# Patient Record
Sex: MALE | Race: WHITE | NOT HISPANIC OR LATINO | Employment: FULL TIME | URBAN - METROPOLITAN AREA
[De-identification: names, ages, dates, MRNs, and addresses within clinical notes are randomized per-mention and may not be internally consistent; named-entity substitution may affect disease eponyms.]

---

## 2020-01-17 ENCOUNTER — HOSPITAL ENCOUNTER (EMERGENCY)
Facility: HOSPITAL | Age: 35
Discharge: HOME | End: 2020-01-17
Admitting: EMERGENCY MEDICINE

## 2020-01-17 ENCOUNTER — APPOINTMENT (OUTPATIENT)
Dept: CARDIOLOGY | Facility: HOSPITAL | Age: 35
End: 2020-01-17

## 2020-01-17 ENCOUNTER — APPOINTMENT (OUTPATIENT)
Dept: GENERAL RADIOLOGY | Facility: HOSPITAL | Age: 35
End: 2020-01-17

## 2020-01-17 VITALS
DIASTOLIC BLOOD PRESSURE: 77 MMHG | OXYGEN SATURATION: 98 % | WEIGHT: 178.79 LBS | BODY MASS INDEX: 27.1 KG/M2 | SYSTOLIC BLOOD PRESSURE: 115 MMHG | HEART RATE: 78 BPM | RESPIRATION RATE: 16 BRPM | TEMPERATURE: 98 F | HEIGHT: 68 IN

## 2020-01-17 DIAGNOSIS — I80.02 THROMBOPHLEBITIS OF SUPERFICIAL VEINS OF LEFT LOWER EXTREMITY: Primary | ICD-10-CM

## 2020-01-17 LAB
BH CV LOW VAS LEFT GREATER SAPH BK VESSEL: 1
BH CV LOWER VASCULAR LEFT COMMON FEMORAL AUGMENT: NORMAL
BH CV LOWER VASCULAR LEFT COMMON FEMORAL COMPETENT: NORMAL
BH CV LOWER VASCULAR LEFT COMMON FEMORAL COMPRESS: NORMAL
BH CV LOWER VASCULAR LEFT COMMON FEMORAL PHASIC: NORMAL
BH CV LOWER VASCULAR LEFT COMMON FEMORAL SPONT: NORMAL
BH CV LOWER VASCULAR LEFT DISTAL FEMORAL COMPRESS: NORMAL
BH CV LOWER VASCULAR LEFT GASTRONEMIUS COMPRESS: NORMAL
BH CV LOWER VASCULAR LEFT GREATER SAPH AK COMPRESS: NORMAL
BH CV LOWER VASCULAR LEFT GREATER SAPH BK COMPRESS: NORMAL
BH CV LOWER VASCULAR LEFT GREATER SAPH BK THROMBUS: NORMAL
BH CV LOWER VASCULAR LEFT LESSER SAPH COMPRESS: NORMAL
BH CV LOWER VASCULAR LEFT MID FEMORAL AUGMENT: NORMAL
BH CV LOWER VASCULAR LEFT MID FEMORAL COMPETENT: NORMAL
BH CV LOWER VASCULAR LEFT MID FEMORAL COMPRESS: NORMAL
BH CV LOWER VASCULAR LEFT MID FEMORAL PHASIC: NORMAL
BH CV LOWER VASCULAR LEFT MID FEMORAL SPONT: NORMAL
BH CV LOWER VASCULAR LEFT PERONEAL COMPRESS: NORMAL
BH CV LOWER VASCULAR LEFT POPLITEAL AUGMENT: NORMAL
BH CV LOWER VASCULAR LEFT POPLITEAL COMPETENT: NORMAL
BH CV LOWER VASCULAR LEFT POPLITEAL COMPRESS: NORMAL
BH CV LOWER VASCULAR LEFT POPLITEAL PHASIC: NORMAL
BH CV LOWER VASCULAR LEFT POPLITEAL SPONT: NORMAL
BH CV LOWER VASCULAR LEFT POSTERIOR TIBIAL COMPRESS: NORMAL
BH CV LOWER VASCULAR LEFT PROXIMAL FEMORAL COMPRESS: NORMAL
BH CV LOWER VASCULAR LEFT SAPHENOFEMORAL JUNCTION AUGMENT: NORMAL
BH CV LOWER VASCULAR LEFT SAPHENOFEMORAL JUNCTION COMPETENT: NORMAL
BH CV LOWER VASCULAR LEFT SAPHENOFEMORAL JUNCTION COMPRESS: NORMAL
BH CV LOWER VASCULAR LEFT SAPHENOFEMORAL JUNCTION PHASIC: NORMAL
BH CV LOWER VASCULAR LEFT SAPHENOFEMORAL JUNCTION SPONT: NORMAL
BH CV LOWER VASCULAR RIGHT COMMON FEMORAL AUGMENT: NORMAL
BH CV LOWER VASCULAR RIGHT COMMON FEMORAL COMPETENT: NORMAL
BH CV LOWER VASCULAR RIGHT COMMON FEMORAL COMPRESS: NORMAL
BH CV LOWER VASCULAR RIGHT COMMON FEMORAL PHASIC: NORMAL
BH CV LOWER VASCULAR RIGHT COMMON FEMORAL SPONT: NORMAL

## 2020-01-17 PROCEDURE — 73610 X-RAY EXAM OF ANKLE: CPT

## 2020-01-17 PROCEDURE — 73590 X-RAY EXAM OF LOWER LEG: CPT

## 2020-01-17 PROCEDURE — 93971 EXTREMITY STUDY: CPT

## 2020-01-17 PROCEDURE — 99283 EMERGENCY DEPT VISIT LOW MDM: CPT

## 2020-01-17 RX ORDER — ASPIRIN 81 MG/1
324 TABLET, CHEWABLE ORAL ONCE
Status: COMPLETED | OUTPATIENT
Start: 2020-01-17 | End: 2020-01-17

## 2020-01-17 RX ADMIN — ASPIRIN 81 MG 324 MG: 81 TABLET ORAL at 13:40

## 2020-01-17 NOTE — ED PROVIDER NOTES
Subjective   History of Present Illness  Patient is a 34-year-old male dimension of care for factor V Leiden mutation who presents with complaints of left lower extremity pain from that started in his ankle and radiates up to his knee for the past week.  Patient reports some associated swelling along the medial aspect at times.  Denies any recent injury to the area.  Patient does report some redness that he noticed along the medial malleolus yesterday.  States the pain is worse with movement better with rest currently rates it a 2/10 severity.  He denies any fever, long travel or immobilization recent surgeries paresthesias numbness or weakness of his lower extremities.  States is taken no medications for symptoms.  Review of Systems   Constitutional: Negative.    Respiratory: Negative.    Cardiovascular: Negative.    Gastrointestinal: Negative for abdominal pain, nausea and vomiting.   Musculoskeletal: Positive for arthralgias, joint swelling and myalgias. Negative for neck pain and neck stiffness.   Skin: Positive for color change.   Neurological: Negative.        Past Medical History:   Diagnosis Date   • Factor 5 Leiden mutation, heterozygous (CMS/HCC)    • Seizures (CMS/HCC)        No Known Allergies    History reviewed. No pertinent surgical history.    Family History   Problem Relation Age of Onset   • No Known Problems Mother        Social History     Socioeconomic History   • Marital status: Single     Spouse name: Not on file   • Number of children: Not on file   • Years of education: Not on file   • Highest education level: Not on file   Tobacco Use   • Smoking status: Current Every Day Smoker     Packs/day: 1.00     Types: Cigarettes   • Smokeless tobacco: Never Used   Substance and Sexual Activity   • Alcohol use: Yes     Frequency: Never     Comment: rare   • Drug use: No   • Sexual activity: Defer           Objective   Physical Exam   Constitutional: He is oriented to person, place, and time. He  "appears well-developed and well-nourished. No distress.   HENT:   Head: Normocephalic and atraumatic.   Eyes: Pupils are equal, round, and reactive to light. EOM are normal. No scleral icterus.   Cardiovascular: Normal rate, regular rhythm, normal heart sounds and intact distal pulses. Exam reveals no gallop and no friction rub.   No murmur heard.  Pulmonary/Chest: Effort normal and breath sounds normal. No stridor. He has no wheezes. He has no rales.   Musculoskeletal: He exhibits tenderness.        Left knee: Normal.        Left ankle: He exhibits no swelling, no deformity, no laceration and normal pulse. Tenderness. Medial malleolus tenderness found. Achilles tendon exhibits normal Nicole's test results.        Left lower leg: He exhibits tenderness. He exhibits no edema, no deformity and no laceration.   Tenderness noted to palpation along the medial malleolus of the left ankle and up the medial aspect of the distal left lower extremity.  No overlying erythema edema or warmth noted of the left knee.  Full range of motion of left knee and ankle with no pain noted.  Compartments soft.  Peripheral pulses intact.   Neurological: He is alert and oriented to person, place, and time. No cranial nerve deficit or sensory deficit.   Skin: Skin is warm. Capillary refill takes less than 2 seconds. No rash noted. He is not diaphoretic. There is erythema. No pallor.   Psychiatric: He has a normal mood and affect. His behavior is normal.   Nursing note and vitals reviewed.      Procedures           ED Course    /77 (BP Location: Right arm, Patient Position: Sitting)   Pulse 78   Temp 98 °F (36.7 °C) (Oral)   Resp 16   Ht 172.7 cm (68\")   Wt 81.1 kg (178 lb 12.7 oz)   SpO2 98%   BMI 27.19 kg/m²   Medications   aspirin chewable tablet 324 mg (has no administration in time range)     Labs Reviewed - No data to display   Xr Tibia Fibula 2 View Left    Result Date: 1/17/2020  No acute osseous abnormality.  " Electronically Signed By-Jamel Inman On:1/17/2020 12:33 PM This report was finalized on [id number] by  Elvi Faria    Xr Ankle 3+ View Left    Result Date: 1/17/2020  No acute osseous abnormality.  Electronically Signed By-Jamel Inman On:1/17/2020 12:32 PM This report was finalized on [id number] by  Elvi Faria                                               MDM  Number of Diagnoses or Management Options  Thrombophlebitis of superficial veins of left lower extremity:   Diagnosis management comments: Chart Review:  Comorbidity: Factor V Leiden mutation  Differentials: DVT, fracture, contusion, strain, sprain      ;this list is not all inclusive and does not constitute the entirety of considered causes  Labs: Not warranted  Imaging: Was interpreted by physician and reviewed by myself:  Xr Tibia Fibula 2 View Left  Result Date: 1/17/2020  No acute osseous abnormality.  Electronically Signed By-Jamel Inman On:1/17/2020 12:33 PM This report was finalized on [id number] by  Elvi Faria    Xr Ankle 3+ View Left  Result Date: 1/17/2020  No acute osseous abnormality.  Electronically Signed By-Jamel Inman On:1/17/2020 12:32 PM This report was finalized on [id number] by  Elvi Faria    Disposition/Treatment:  While in the ED patient was afebrile.  Nontoxic ambulatory with upright steady gait x-ray showed no acute osseous abnormalities Doppler ultrasound significant for superficial thrombophlebitis verbal feedback from Elisa technician.  Patient was given aspirin in the ED.  Lab results and findings were discussed with the patient  who voiced understanding of discharge instructions along with signs and symptoms requiring return to the ED.  Upon discharged patient was in stable condition with followup for a revaluation.  Patient was advised to use compression stockings and take aspirin daily patient states he will call his primary care provider for further evaluation.  He  continued to deny chest pain while in the ED.       Amount and/or Complexity of Data Reviewed  Tests in the radiology section of CPT®: reviewed        Final diagnoses:   Thrombophlebitis of superficial veins of left lower extremity            Anjelica Najera PA  01/17/20 1337       Anjelica Najera PA  01/17/20 1332

## 2020-01-17 NOTE — DISCHARGE INSTRUCTIONS
Wear compression stockings for the next 2 to 3 weeks especially while at work.  Take a daily over-the-counter aspirin    Follow-up with your primary care provider in 3-5 days.  If you do not have a primary care provider call [phone] for help in finding one, or you may follow up with [facility] at [phone].    Return to ED for any new or worsening symptoms

## 2020-01-17 NOTE — ED NOTES
Pt reports that his leg/ankle pain started at the ankle bone on the inside of his left leg about a week ago, has progressively gotten worse and worked up the leg to the knee     Khushboo Hoffman, RN  01/17/20 5488

## 2020-02-14 NOTE — PROGRESS NOTES
HEMATOLOGY ONCOLOGY OUTPATIENT CONSULTATION       Patient name: Keegan Mederos  : 1985  MRN: [id number]  Primary Care Physician: Cam Rivera MD  Referring Physician: Cam Rivera MD  Reason For Consult:     Chief Complaint   Patient presents with   • Appointment   History of deep venous thrombosis and a recent episode of Acute left lower extremity superficial thrombophlebitis in the greater saphenous vein.      HPI:   History of Present Illness:  Keegan Mederos is  a 34-year-old male who was admitted to the hospital on 6/9/15 after he presented with left lower extremity pain and swelling as well as pleuritic chest pains.  Patient said he had gone on a prolonged trip to [place], over 13 hours trip, a few weeks prior to the presentation.  He was admitted to the hospital on 6/9/15.  He had a Doppler study of the lower extremities which showed DVT involving the left peroneal vein.  He had a CT scan of the chest which showed multiple small segmental branch pulmonary emboli involving the lower lobes bilaterally, right more than left.  There were associated areas of pleural based airspace ground-glass opacity distal to the areas of PE suggesting pulmonary infarction or possibly airspace disease due to septic emboli.  Patient had several blood cultures done.  Septic emboli were ruled out.  Patient was initially treated with heparinoids and subsequently switched to Xarelto, which he is so far tolerating well.  He is here today to be further evaluated for possible causes for the blood clot.  His symptoms have almost completely resolved back to his baseline.  Patient states that prior to the above clinical presentation he was healthy.  He denied any weight loss, night sweats or fatigue symptoms or any pain issues.    • Patient had antithrombin III level (N), protein C activity (L) 50 with normal being , protein S activity (N) 153%.  Prolonged PT 17.4, PTT 58.8, INR of 1.4.  Mixing  studies suggested continued prolongation of the PTT to 37.7 seconds following one hour in the mixing test.  This suggests the presence of an inhibitor.  Prothrombin gene was not mutated.  Was found to have factor V heterozygote mutation.  Anticardiolipin antibodies were negative, as well as beta-2 glycoprotein.   • 7/31/2019 WBC 7.7, hemoglobin 15, MCV 88, platelets 300, creatinine 0.9 LFTs normal,  • July 2015 to February 2020: Patient has not followed up in our office.  • 1/17/2020: Patient presented to emergency department with swelling redness and pain in the left medial lower leg.  • 1/17/2020: Doppler of left lower leg: Acute left lower extremity superficial thrombophlebitis noted in the greater saphenous vein.      Subjective:02/19/20   Patient presents to the office for an initial consultation regarding blood clots. His mother is present at today's visit. He states that he was diagnosed with Factor Five Leiden and Pulmonary Embolism when he was 27. He was put on Heparin and a prescription blood thinner po daily, He was put on this medicine for around three or four months and was cleared.  He denies any family history of  clots.  He states around a month ago he had swelling, redness and pain of the left leg and went to the ER. He was told to wear compression stockings and take 325mg aspirin. He states the swelling and the pain have improved. He states he is still wearing the compression stockings. He states he has a seizure disorder, but does not take any medication for this due to side effects.     The following portions of the patient's history were reviewed and updated as appropriate: allergies, current medications, past family history, past medical history, past social history, past surgical history and problem list.     Past Medical History:   Diagnosis Date   • Factor 5 Leiden mutation, heterozygous (CMS/HCC)    • Seizures (CMS/HCC)    History of left leg deep venous thrombosis in June 2015 following a  "prolonged road trip.  History of pulmonary emboli involving the bilateral lower lobes in 2015.    No past surgical history on file.      Current Outpatient Medications:   •  aspirin 325 MG tablet, Take 325 mg by mouth Daily., Disp: , Rfl:     No Known Allergies    Family History   Problem Relation Age of Onset   • No Known Problems Mother        Cancer-related family history is not on file.    Social History     Tobacco Use   • Smoking status: Current Every Day Smoker     Packs/day: 1.00     Types: Cigarettes   • Smokeless tobacco: Never Used   Substance Use Topics   • Alcohol use: Yes     Frequency: Never     Comment: rare   • Drug use: No   Started smoking at age 17.      Social History     Social History Narrative    Patient is single. He has no children. He is a / for [employer]. He lives in [place]. He is a current smoker and has been since the age of [age].      ROS:     Review of Systems   Constitutional: Positive for fatigue (could be work-related). Negative for activity change, chills and fever.   HENT: Negative for drooling, ear discharge, mouth sores, nosebleeds and sore throat.    Eyes: Negative for photophobia, pain, redness and visual disturbance.   Respiratory: Negative for cough, choking, shortness of breath and wheezing.    Cardiovascular: Negative for chest pain and palpitations.   Gastrointestinal: Negative for abdominal pain, diarrhea, nausea and vomiting.   Genitourinary: Negative for dysuria.   Musculoskeletal: Negative for neck stiffness.   Skin: Negative for rash.   Neurological: Positive for seizures (simple atonic epiliptic seizures (right side goes numb)). Negative for syncope and speech difficulty.   Psychiatric/Behavioral: Negative for agitation, confusion and hallucinations.       Objective:    Vitals:    02/19/20 1510   BP: 131/85   Pulse: 111   Resp: 18   Temp: 97.9 °F (36.6 °C)   Weight: 81.1 kg (178 lb 12.8 oz)   Height: 172.7 cm (68\") "   PainSc: 0-No pain     ECOG  (0) Fully active, able to carry on all predisease performance without restriction    Physical Exam:     Physical Exam   Constitutional: He is oriented to person, place, and time. He appears well-developed and well-nourished.   HENT:   Head: Normocephalic and atraumatic.   Nose: Nose normal.   Mouth/Throat: Oropharynx is clear and moist. No oropharyngeal exudate.   Eyes: Conjunctivae and EOM are normal. No scleral icterus.   Neck: Normal range of motion. No tracheal deviation present. No thyromegaly present.   Cardiovascular: Regular rhythm and normal heart sounds.   Pulmonary/Chest: No stridor.   Abdominal: Soft. Bowel sounds are normal. He exhibits no distension and no mass. There is no tenderness.   Musculoskeletal: Normal range of motion. He exhibits no edema or deformity.   Lymphadenopathy:     He has no cervical adenopathy.   Neurological: He is alert and oriented to person, place, and time. No sensory deficit.   Skin: Skin is warm.   Psychiatric: His behavior is normal.   Vitals reviewed.        Lab Results - Last 18 Months   Lab Units 05/09/19  1624   WBC 10*3/uL 8.1   HEMOGLOBIN g/dL 15.0   HEMATOCRIT % 44.2   PLATELETS 10*3/uL 329   MCV fL 92.5     Lab Results - Last 18 Months   Lab Units 05/09/19  1624   SODIUM mmol/L 134*   POTASSIUM mmol/L 4.3   CHLORIDE mmol/L 101   TOTAL CO2 mmol/L 24   BUN mg/dL 12   CREATININE mg/dl 0.9   CALCIUM mg/dL 9.3   BILIRUBIN mg/dL 0.5   ALK PHOS IU/L 60   ALT (SGPT) IU/L 30   AST (SGOT) IU/L 22   GLUCOSE mg/dL 91       Lab Results   Component Value Date    GLUCOSE 91 05/09/2019    BUN 12 05/09/2019    CREATININE 0.9 05/09/2019    BCR 13.3 05/09/2019    K 4.3 05/09/2019    CO2 24 05/09/2019    CALCIUM 9.3 05/09/2019    ALBUMIN 4.3 05/09/2019    LABIL2 1.7 05/09/2019    AST 22 05/09/2019    ALT 30 05/09/2019       Lab Results - Last 18 Months   Lab Units 05/09/19  1624   INR  1.0   APTT sec 26.4       No results found for: IRON, TIBC,  FERRITIN    No results found for: FOLATE    No results found for: OCCULTBLD    No results found for: RETICCTPCT    No results found for: ESOHXSGK29  No results found for: SPEP, UPEP  No results found for: LDH, URICACID  No results found for: MAT, RF, SEDRATE  No results found for: FIBRINOGEN, HAPTOGLOBIN  Lab Results   Component Value Date    PTT 26.4 05/09/2019    INR 1.0 05/09/2019     No results found for:   No results found for: CEA  No components found for: CA-19-9  No results found for: PSA      Assessment/Plan     Assessment:  1. Acute left lower extremity greater saphenous thrombophlebitis: Resolving with aspirin and compression stocking.  2. History of deep venous thrombosis involving the left lower extremity.  This was in 2015 following a road trip.  He was treated with Xarelto for about 4 months.  3. Bilateral pulmonary emboli.   Same as above.  4. His thrombosis was likely provoked secondary to recent travel.   5. Heterozygote mutation for factor V Leiden.    6. Low protein C activity likely secondary to acute clot, but cannot rule out inherited deficiency.   .      PLAN:      1. Continue aspirin 325  2. Continue compression stocking for thrombophlebitis   3. check antiphospholipid antibodies, serum homocystine, lupus anticoagulant testing, protein C activity.  4. Check d-dimer to see if there is any active clotting process.   5.  Advised that is close family members need to be tested for factor V Leiden mutation.  6. we will follow patient back in 3 weeks.         Factor 5 Leiden mutation, heterozygous (CMS/HCC)  - Protein C Activity  - Lupus Anticoagulant  - Homocysteine  - D-dimer, Quantitative  - Anticardiolipin Antibody, IgG / M, Qn  - Beta-2 Glycoprotein Antibodies  - Phosphatidylserine Antibodies    Orders Placed This Encounter   Procedures   • Protein C Activity   • Lupus Anticoagulant   • Homocysteine   • D-dimer, Quantitative   • Anticardiolipin Antibody, IgG / M, Qn   • Beta-2  Glycoprotein Antibodies   • Phosphatidylserine Antibodies                 Thank you very much for providing the opportunity to participate in this patient’s care. Please do not hesitate to call if there are any other questions      I have reviewed all relevant outside reports, notes, labs results, imaging, vitals, medications and plan with the patient today.    Portions of the note have been Scribed by medical assistant/Nurse.  I have reviewed and made changes accordingly.

## 2020-02-19 ENCOUNTER — CONSULT (OUTPATIENT)
Dept: ONCOLOGY | Facility: CLINIC | Age: 35
End: 2020-02-19

## 2020-02-19 ENCOUNTER — APPOINTMENT (OUTPATIENT)
Dept: LAB | Facility: HOSPITAL | Age: 35
End: 2020-02-19

## 2020-02-19 VITALS
WEIGHT: 178.8 LBS | HEART RATE: 111 BPM | DIASTOLIC BLOOD PRESSURE: 85 MMHG | HEIGHT: 68 IN | BODY MASS INDEX: 27.1 KG/M2 | TEMPERATURE: 97.9 F | RESPIRATION RATE: 18 BRPM | SYSTOLIC BLOOD PRESSURE: 131 MMHG

## 2020-02-19 DIAGNOSIS — D68.51 FACTOR 5 LEIDEN MUTATION, HETEROZYGOUS (HCC): Primary | ICD-10-CM

## 2020-02-19 DIAGNOSIS — Z86.718 HISTORY OF DVT (DEEP VEIN THROMBOSIS): ICD-10-CM

## 2020-02-19 DIAGNOSIS — Z86.711 HISTORY OF PULMONARY EMBOLISM: ICD-10-CM

## 2020-02-19 PROBLEM — G40.909 EPILEPSY (HCC): Status: ACTIVE | Noted: 2019-07-31

## 2020-02-19 LAB — D DIMER PPP FEU-MCNC: 0.36 MCGFEU/ML (ref 0.17–0.59)

## 2020-02-19 PROCEDURE — 85379 FIBRIN DEGRADATION QUANT: CPT | Performed by: NURSE PRACTITIONER

## 2020-02-19 PROCEDURE — 99204 OFFICE O/P NEW MOD 45 MIN: CPT | Performed by: INTERNAL MEDICINE

## 2020-02-19 PROCEDURE — 83090 ASSAY OF HOMOCYSTEINE: CPT | Performed by: NURSE PRACTITIONER

## 2020-02-19 PROCEDURE — 36415 COLL VENOUS BLD VENIPUNCTURE: CPT | Performed by: INTERNAL MEDICINE

## 2020-02-19 PROCEDURE — 85303 CLOT INHIBIT PROT C ACTIVITY: CPT | Performed by: NURSE PRACTITIONER

## 2020-02-19 RX ORDER — ASPIRIN 325 MG
325 TABLET ORAL DAILY
COMMUNITY

## 2020-02-20 LAB
CARDIOLIPIN IGG SER IA-ACNC: <9 GPL U/ML (ref 0–14)
CARDIOLIPIN IGM SER IA-ACNC: <9 MPL U/ML (ref 0–12)
HCYS SERPL-MCNC: 12.7 UMOL/L (ref 0–15)

## 2020-02-21 LAB
B2 GLYCOPROT1 IGA SER-ACNC: <9 GPI IGA UNITS (ref 0–25)
B2 GLYCOPROT1 IGG SER-ACNC: <9 GPI IGG UNITS (ref 0–20)
B2 GLYCOPROT1 IGM SER-ACNC: <9 GPI IGM UNITS (ref 0–32)
LA NT DPL PPP: 38.6 SEC (ref 0–55)
LA NT DPL/LA NT HPL PPP-RTO: 1.02 RATIO (ref 0–1.4)
LA NT PLATELET PPP: 33.6 SEC (ref 0–51.9)
LUPUS ANTICOAGULANT REFLEX: NORMAL
PROT C ACT/NOR PPP: 49 % (ref 70–130)
SCREEN DRVVT: 34.2 SEC (ref 0–47)
THROMBIN TIME: 18.1 SEC (ref 0–23)

## 2020-02-22 LAB
PS IGA SER-ACNC: 1 APS IGA (ref 0–20)
PS IGG SER-ACNC: 2 GPS IGG (ref 0–11)
PS IGM SER-ACNC: 8 MPS IGM (ref 0–25)

## 2020-02-23 PROBLEM — Z86.711 HISTORY OF PULMONARY EMBOLISM: Status: ACTIVE | Noted: 2020-02-23

## 2020-02-23 PROBLEM — Z86.718 HISTORY OF DVT (DEEP VEIN THROMBOSIS): Status: ACTIVE | Noted: 2020-02-23

## 2020-03-10 NOTE — PROGRESS NOTES
HEMATOLOGY ONCOLOGY OUTPATIENT CONSULTATION       Patient name: Keegan Mederos  : 1985  MRN: [id number]  Primary Care Physician: Cam Rivera MD  Referring Physician: Cam Rivera MD  Reason For Consult:     No chief complaint on file.  History of deep venous thrombosis and a recent episode of Acute left lower extremity superficial thrombophlebitis in the greater saphenous vein.      HPI:   History of Present Illness:  Keegan Mederos is  a 34-year-old male who was admitted to the hospital on 6/9/15 after he presented with left lower extremity pain and swelling as well as pleuritic chest pains.  Patient said he had gone on a prolonged trip to [place], over 13 hours trip, a few weeks prior to the presentation.  He was admitted to the hospital on 6/9/15.  He had a Doppler study of the lower extremities which showed DVT involving the left peroneal vein.  He had a CT scan of the chest which showed multiple small segmental branch pulmonary emboli involving the lower lobes bilaterally, right more than left.  There were associated areas of pleural based airspace ground-glass opacity distal to the areas of PE suggesting pulmonary infarction or possibly airspace disease due to septic emboli.  Patient had several blood cultures done.  Septic emboli were ruled out.  Patient was initially treated with heparinoids and subsequently switched to Xarelto, which he is so far tolerating well.  He is here today to be further evaluated for possible causes for the blood clot.  His symptoms have almost completely resolved back to his baseline.  Patient states that prior to the above clinical presentation he was healthy.  He denied any weight loss, night sweats or fatigue symptoms or any pain issues.    • Patient had antithrombin III level (N), protein C activity (L) 50 with normal being , protein S activity (N) 153%.  Prolonged PT 17.4, PTT 58.8, INR of 1.4.  Mixing studies suggested continued  prolongation of the PTT to 37.7 seconds following one hour in the mixing test.  This suggests the presence of an inhibitor.  Prothrombin gene was not mutated.  Was found to have factor V heterozygote mutation.  Anticardiolipin antibodies were negative, as well as beta-2 glycoprotein.   • 7/31/2019 WBC 7.7, hemoglobin 15, MCV 88, platelets 300, creatinine 0.9 LFTs normal,  • July 2015 to February 2020: Patient has not followed up in our office.  • 1/17/2020: Patient presented to emergency department with swelling redness and pain in the left medial lower leg.  • 1/17/2020: Doppler of left lower leg: Acute left lower extremity superficial thrombophlebitis noted in the greater saphenous vein.  • 02/19/20 Patient presents to the office for an initial consultation regarding blood clots. His mother is present at today's visit. He states that he was diagnosed with Factor Five Leiden and Pulmonary Embolism when he was 27. He was put on Heparin and a prescription blood thinner po daily, He was put on this medicine for around three or four months and was cleared.  He denies any family history of  clots.  He states around a month ago he had swelling, redness and pain of the left leg and went to the ER. He was told to wear compression stockings and take 325mg aspirin. He states the swelling and the pain have improved. He states he is still wearing the compression stockings. He states he has a seizure disorder, but does not take any medication for this due to side effects.   • 2/19/2020: Plasma homocystine 12.7, anticardiolipin antibodies negative, phosphatidylserine antibodies negative, beta-2 glycoprotein antibodies negative, d-dimer 0.36, PTT-LA 33.6, protein C activity 49 low, thrombin time 18.1,  • 3/11/2020 WBC 7.6, hemoglobin 15.4, platelets 305       Subjective:03/11/20  Here for a follow up appointment of Acute left lower extremity greater saphenous thrombophlebitis. He states he is feeling fine today and states the  pain and redness to his leg has resolved.  He continues to wear compression stockings. He states he no longer takes aspirin and states it bothers his stomach. He denies any SOA.     The following portions of the patient's history were reviewed and updated as appropriate: allergies, current medications, past family history, past medical history, past social history, past surgical history and problem list.     Past Medical History:   Diagnosis Date   • Factor 5 Leiden mutation, heterozygous (CMS/HCC)    • Seizures (CMS/HCC)    History of left leg deep venous thrombosis in June 2015 following a prolonged road trip.  History of pulmonary emboli involving the bilateral lower lobes in 2015.    No past surgical history on file.      Current Outpatient Medications:   •  aspirin 325 MG tablet, Take 325 mg by mouth Daily., Disp: , Rfl:     No Known Allergies    Family History   Problem Relation Age of Onset   • No Known Problems Mother        Cancer-related family history is not on file.    Social History     Tobacco Use   • Smoking status: Current Every Day Smoker     Packs/day: 1.00     Types: Cigarettes   • Smokeless tobacco: Never Used   Substance Use Topics   • Alcohol use: Yes     Frequency: Never     Comment: rare   • Drug use: No   Started smoking at age 17.      Social History     Social History Narrative    Patient is single. He has no children. He is a / for [employer]. He lives in [place]. He is a current smoker and has been since the age of [age].      ROS:     Review of Systems   Constitutional: Positive for fatigue (could be work-related). Negative for activity change, chills and fever.   HENT: Negative for drooling, ear discharge, mouth sores, nosebleeds and sore throat.    Eyes: Negative for photophobia, pain, redness and visual disturbance.   Respiratory: Negative for cough, choking, shortness of breath and wheezing.    Cardiovascular: Negative for chest pain and  palpitations.   Gastrointestinal: Negative for abdominal pain, diarrhea, nausea and vomiting.   Genitourinary: Negative for dysuria.   Musculoskeletal: Negative for neck stiffness.   Skin: Negative for rash.   Neurological: Positive for seizures (simple atonic epiliptic seizures (right side goes numb)). Negative for syncope and speech difficulty.   Psychiatric/Behavioral: Negative for agitation, confusion and hallucinations.       Objective:    There were no vitals filed for this visit.  ECOG  (0) Fully active, able to carry on all predisease performance without restriction    Physical Exam:     Physical Exam   Constitutional: He is oriented to person, place, and time. He appears well-developed and well-nourished.   HENT:   Head: Normocephalic and atraumatic.   Nose: Nose normal.   Mouth/Throat: Oropharynx is clear and moist. No oropharyngeal exudate.   Eyes: Conjunctivae and EOM are normal. No scleral icterus.   Neck: Normal range of motion. No tracheal deviation present. No thyromegaly present.   Cardiovascular: Regular rhythm and normal heart sounds.   Pulmonary/Chest: No stridor.   Abdominal: Soft. Bowel sounds are normal. He exhibits no distension and no mass. There is no tenderness.   Musculoskeletal: Normal range of motion. He exhibits no edema or deformity.   Lymphadenopathy:     He has no cervical adenopathy.   Neurological: He is alert and oriented to person, place, and time. No sensory deficit.   Skin: Skin is warm.   Psychiatric: His behavior is normal.   Vitals reviewed.        Lab Results - Last 18 Months   Lab Units 05/09/19  1624   WBC 10*3/uL 8.1   HEMOGLOBIN g/dL 15.0   HEMATOCRIT % 44.2   PLATELETS 10*3/uL 329   MCV fL 92.5     Lab Results - Last 18 Months   Lab Units 05/09/19  1624   SODIUM mmol/L 134*   POTASSIUM mmol/L 4.3   CHLORIDE mmol/L 101   TOTAL CO2 mmol/L 24   BUN mg/dL 12   CREATININE mg/dl 0.9   CALCIUM mg/dL 9.3   BILIRUBIN mg/dL 0.5   ALK PHOS IU/L 60   ALT (SGPT) IU/L 30   AST  (SGOT) IU/L 22   GLUCOSE mg/dL 91       Lab Results   Component Value Date    GLUCOSE 91 05/09/2019    BUN 12 05/09/2019    CREATININE 0.9 05/09/2019    BCR 13.3 05/09/2019    K 4.3 05/09/2019    CO2 24 05/09/2019    CALCIUM 9.3 05/09/2019    ALBUMIN 4.3 05/09/2019    LABIL2 1.7 05/09/2019    AST 22 05/09/2019    ALT 30 05/09/2019       Lab Results - Last 18 Months   Lab Units 05/09/19  1624   INR  1.0   APTT sec 26.4       No results found for: IRON, TIBC, FERRITIN    No results found for: FOLATE    No results found for: OCCULTBLD    No results found for: RETICCTPCT    No results found for: [id number]  No results found for: SPEP, UPEP  No results found for: LDH, URICACID  No results found for: MAT, RF, SEDRATE  No results found for: FIBRINOGEN, HAPTOGLOBIN  Lab Results   Component Value Date    PTT 26.4 05/09/2019    INR 1.0 05/09/2019     No results found for:   No results found for: CEA  No components found for: CA-19-9  No results found for: PSA      Assessment/Plan     Assessment:  1. Acute left lower extremity greater saphenous thrombophlebitis: Resolving with aspirin and compression stocking.  2. History of deep venous thrombosis involving the left lower extremity.  This was in 2015 following a road trip.  He was treated with Xarelto for about 4 months.His thrombosis was likely provoked secondary to  travel.  3. Bilateral pulmonary emboli.   Same as above.  4. Heterozygote mutation for factor V Leiden.    5. Low protein C activity: This has been noted on at least 2 occasions.  Patient could be having low normal protein C.  .      PLAN:    1. Discontinue aspirin 325.  Advised to use enteric-coated baby aspirin as the full dose aspirin is bothering his stomach.  2. Continue compression stocking for thrombophlebitis   3. Repeat protein C activity as well as d-dimer before the next visit  4. Advised his mother to be checked for factor V Leiden mutation and protein C.  5. We will follow patient back in 6  months with repeat protein C and d-dimer.         There are no diagnoses linked to this encounter.  No orders of the defined types were placed in this encounter.                Thank you very much for providing the opportunity to participate in this patient’s care. Please do not hesitate to call if there are any other questions      I have reviewed all relevant outside reports, notes, labs results, imaging, vitals, medications and plan with the patient today.    Portions of the note have been Scribed by medical assistant/Nurse.  I have reviewed and made changes accordingly.

## 2020-03-11 ENCOUNTER — OFFICE VISIT (OUTPATIENT)
Dept: ONCOLOGY | Facility: CLINIC | Age: 35
End: 2020-03-11

## 2020-03-11 ENCOUNTER — APPOINTMENT (OUTPATIENT)
Dept: LAB | Facility: HOSPITAL | Age: 35
End: 2020-03-11

## 2020-03-11 VITALS
BODY MASS INDEX: 26.25 KG/M2 | TEMPERATURE: 98.2 F | DIASTOLIC BLOOD PRESSURE: 84 MMHG | HEART RATE: 106 BPM | RESPIRATION RATE: 18 BRPM | HEIGHT: 68 IN | WEIGHT: 173.2 LBS | SYSTOLIC BLOOD PRESSURE: 107 MMHG

## 2020-03-11 DIAGNOSIS — D68.51 FACTOR 5 LEIDEN MUTATION, HETEROZYGOUS (HCC): Primary | ICD-10-CM

## 2020-03-11 DIAGNOSIS — Z86.718 HISTORY OF DVT (DEEP VEIN THROMBOSIS): Primary | ICD-10-CM

## 2020-03-11 DIAGNOSIS — D68.51 FACTOR 5 LEIDEN MUTATION, HETEROZYGOUS (HCC): ICD-10-CM

## 2020-03-11 LAB
BASOPHILS # BLD AUTO: 0.03 10*3/MM3 (ref 0–0.2)
BASOPHILS NFR BLD AUTO: 0.4 % (ref 0–1.5)
DEPRECATED RDW RBC AUTO: 42.9 FL (ref 37–54)
EOSINOPHIL # BLD AUTO: 0.06 10*3/MM3 (ref 0–0.4)
EOSINOPHIL NFR BLD AUTO: 0.8 % (ref 0.3–6.2)
ERYTHROCYTE [DISTWIDTH] IN BLOOD BY AUTOMATED COUNT: 13.2 % (ref 12.3–15.4)
HCT VFR BLD AUTO: 45.1 % (ref 37.5–51)
HGB BLD-MCNC: 15.4 G/DL (ref 13–17.7)
LYMPHOCYTES # BLD AUTO: 1.98 10*3/MM3 (ref 0.7–3.1)
LYMPHOCYTES NFR BLD AUTO: 25.9 % (ref 19.6–45.3)
MCH RBC QN AUTO: 30.8 PG (ref 26.6–33)
MCHC RBC AUTO-ENTMCNC: 34.1 G/DL (ref 31.5–35.7)
MCV RBC AUTO: 90.2 FL (ref 79–97)
MONOCYTES # BLD AUTO: 0.51 10*3/MM3 (ref 0.1–0.9)
MONOCYTES NFR BLD AUTO: 6.7 % (ref 5–12)
NEUTROPHILS # BLD AUTO: 5.06 10*3/MM3 (ref 1.7–7)
NEUTROPHILS NFR BLD AUTO: 66.2 % (ref 42.7–76)
PLATELET # BLD AUTO: 305 10*3/MM3 (ref 140–450)
PMV BLD AUTO: 8.6 FL (ref 6–12)
RBC # BLD AUTO: 5 10*6/MM3 (ref 4.14–5.8)
WBC NRBC COR # BLD: 7.64 10*3/MM3 (ref 3.4–10.8)

## 2020-03-11 PROCEDURE — 99214 OFFICE O/P EST MOD 30 MIN: CPT | Performed by: INTERNAL MEDICINE

## 2020-03-11 PROCEDURE — 36415 COLL VENOUS BLD VENIPUNCTURE: CPT

## 2020-03-11 PROCEDURE — 85025 COMPLETE CBC W/AUTO DIFF WBC: CPT
